# Patient Record
(demographics unavailable — no encounter records)

---

## 2025-07-07 NOTE — ASSESSMENT
[FreeTextEntry1] : This is a delightful 78-year-old female presents my office today for neurosurgical consultation by referral of her neurologist.  Fortunately she seen multiple doctors for her complaints and feels that she is getting quite a bit of a runaround.  Her primary complaint is left ankle pain that radiates up to her left calf.  She denies any radicular pain from her back into her left leg.  She does have some occasional but somewhat moderate to mild right hip flexor pain.  She denies any new numbness tingling weakness or bladder bowel dysfunction.  She has tried multiple times with pain management which include epidurals or injections as well as ankle injections.  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  She has a CT of the lumbar spine.  [facility] shows multilevel lumbar scoliosis as well as severe degenerative disc disease causing severe lumbar foraminal stenosis at L4-L5 on the left side.  They spine her clinical presentation I do believe the patient's primary symptom is a lower extremity complaint and I recommend orthopedic evaluation.  I would not pursue any further neurosurgical workup nor intervention.  Pain management should be held off on this time as I do not believe her symptoms are radicular in nature understands and agrees to plan